# Patient Record
Sex: MALE | Race: WHITE
[De-identification: names, ages, dates, MRNs, and addresses within clinical notes are randomized per-mention and may not be internally consistent; named-entity substitution may affect disease eponyms.]

---

## 2018-06-21 ENCOUNTER — HOSPITAL ENCOUNTER (OUTPATIENT)
Dept: HOSPITAL 74 - FASU-ENDO | Age: 73
Discharge: HOME | End: 2018-06-21
Attending: INTERNAL MEDICINE
Payer: COMMERCIAL

## 2018-06-21 VITALS — SYSTOLIC BLOOD PRESSURE: 110 MMHG | DIASTOLIC BLOOD PRESSURE: 62 MMHG | HEART RATE: 58 BPM

## 2018-06-21 VITALS — TEMPERATURE: 98.2 F

## 2018-06-21 VITALS — BODY MASS INDEX: 28.8 KG/M2

## 2018-06-21 DIAGNOSIS — K29.50: Primary | ICD-10-CM

## 2018-06-21 DIAGNOSIS — R07.89: ICD-10-CM

## 2018-06-21 PROCEDURE — 0DB68ZX EXCISION OF STOMACH, VIA NATURAL OR ARTIFICIAL OPENING ENDOSCOPIC, DIAGNOSTIC: ICD-10-PCS | Performed by: INTERNAL MEDICINE

## 2018-06-21 PROCEDURE — 0DB98ZX EXCISION OF DUODENUM, VIA NATURAL OR ARTIFICIAL OPENING ENDOSCOPIC, DIAGNOSTIC: ICD-10-PCS | Performed by: INTERNAL MEDICINE

## 2018-06-21 PROCEDURE — 0DB48ZX EXCISION OF ESOPHAGOGASTRIC JUNCTION, VIA NATURAL OR ARTIFICIAL OPENING ENDOSCOPIC, DIAGNOSTIC: ICD-10-PCS | Performed by: INTERNAL MEDICINE

## 2018-06-22 NOTE — PATH
Surgical Pathology Report



Patient Name:  SHANNA BETH

Accession #:  

[facility]. Rec. #:  [id number]                                                        

   /Age/Gender:  1945 (Age: 73) / M

Account:  [id number]                                                          

             Location: [facility] AMBULATORY 

Taken:  2018

Received:  2018

Reported:  2018

Physicians:  Honorio Powell M.D.

  



Specimen(s) Received

A: BX DUODENUM 

B: BX ANTRUM 

C: DISTAL ESOPHAGUS R/O BARRETS ESOPHAGUS 





Clinical History

GERD

Postoperative diagnosis: Gastritis, duodenitis, rule out celiac, rule out

Smart's esophagus







Final Diagnosis

A. DUODENUM, BIOPSY:

DUODENAL MUCOSA WITH NO PATHOLOGIC FINDINGS.



Note: Features suggestive of celiac disease are not identified in this biopsy.



B. ANTRUM, BIOPSY:

MILD CHRONIC ACTIVE GASTRITIS.

IMMUNOSTAIN IS NEGATIVE FOR H. PYLORI ORGANISMS ARE



C. DISTAL ESOPHAGUS, BIOPSY:

ESOPHAGEAL (SQUAMOUS) MUCOSA WITH NO SIGNIFICANT PATHOLOGIC FINDINGS.

NO COLUMNAR EPITHELIUM/INTESTINAL METAPLASIA IS IDENTIFIED.







***Electronically Signed***

Liana Oglesby M.D.





Gross Description

A.  Received in formalin, labeled "duodenum" are 2 tan, irregular portions of

soft tissue measuring 0.3 and 0.4 cm. in greatest dimension. The specimens are

submitted in toto in one cassette.



B.  Received in formalin, labeled "antrum" are 2 tan, irregular portions of soft

tissue averaging 0.3 cm. in greatest dimension. The specimens are submitted in

toto in one cassette.



C.  Received in formalin, labeled "distal esophagus" is a tan, irregular portion

of soft tissue measuring 0.2 cm. in greatest dimension. The specimen is

submitted in toto in one cassette.

/2018



[facility]